# Patient Record
(demographics unavailable — no encounter records)

---

## 2019-07-30 NOTE — NUR
ED Nurse Note:

Patient walked into ED c/o left kidney pain that has been an ongoing issue for 
30 days now, states that shes had a prior history of kidney stones and is 
feeling same symptoms as well

## 2019-07-30 NOTE — EMERGENCY ROOM REPORT
History of Present Illness


General


Chief Complaint:  Abdominal Pain


Source:  Patient


 (Conner Mei MD)





Present Illness


HPI


HPI: This is a 28-year-old female with a history of recurrent nephrolithiasis 

status post surgical resection 5 years ago, ectopic pregnancy x2, depression/

anxiety presenting for evaluation of left-sided flank pain.  Symptoms began 

approximately 1 week ago.  She notes left-sided flank pain radiating to the 

left pelvis and through into the left groin.  She had 1 day of gross hematuria 

2 days ago however this is now resolved.  She denies any dysuria or vaginal 

discharge/bleeding otherwise.  This morning she began to experience significant 

nausea and several episodes of emesis which seemed to be now resolving.  Her 

left-sided flank pain is getting worse and describes it as similar to her 

previous episodes of nephrolithiasis.  Otherwise, she denies any fevers, chest 

pain, shortness of breath, URI symptoms, diarrhea, rash or other change in her 

health.





PMH: Nephrolithiasis, ectopic pregnancy





PSH: Lithotripsy, nephrostomy tubes, laparoscopy





Social Hx: Denies drug use, smoking, drinking alcohol


 (Conner Mei MD)


Allergies:  


Coded Allergies:  


     No Known Allergies (Unverified , 19)





Patient History


Last Menstrual Period:  19


Pregnant Now:  No


:  2


Para:  2


 (Conner Mei MD)





Nursing Documentation-[facility]


Past Medical History:  No History, Except For


History Of Psychiatric Problem:  Yes - Bipolar, Anxiety 


 (Conner Mei MD)





Review of Systems


All Other Systems:  negative except mentioned in HPI


 (Conner Mei MD)





Physical Exam





Vital Signs








  Date Time  Temp Pulse Resp B/P (MAP) Pulse Ox O2 Delivery O2 Flow Rate FiO2


 


19 19:33 98.8 80 18 113/75 (88) 98 Room Air  








General: Awake and alert, no acute distress


HEENT: NC/AT. EOMI. 


Neck: Supple, trachea midline


Chest Wall: No tenderness, no deformity


Cardiovascular: RRR.  S1 and S2 normal.  No murmur appreciated


Resp: Normal work of breathing. No cough, wheezing or crackles appreciated


Abdomen: Soft, tenderness in the left lower quadrant without rebound.  No 

masses appreciated.  Nondistended


Skin: Intact.  No abrasions, laceration or rash over the exposed skin


MSK: Normal tone and bulk.  No obvious deformity. Moving all extremities


Neuro: Awake and alert.  Mentating appropriately.  


Spine/Back: Left-sided CVA tenderness, no tenderness on the right.


 (Conner Mei MD)





 VS - Last 72 Hours, by Label








  Date Time  Temp Pulse Resp B/P (MAP) Pulse Ox O2 Delivery O2 Flow Rate FiO2


 


19 20:58 98.7       


 


19 19:45  80 18   Room Air  


 


19 19:33 98.8 80 18 113/75 (88) 98 Room Air  








Sp02 EP Interpretation:  reviewed, normal


General Appearance:  well appearing, no apparent distress, alert


Head:  normocephalic, atraumatic


Eyes:  bilateral eye PERRL, bilateral eye EOMI


ENT:  uvula midline, moist mucus membranes


Neck:  supple, thyroid normal, supple/symm/no masses


Respiratory:  lungs clear, no respiratory distress, no retraction, no accessory 

muscle use


Cardiovascular #1:  normal peripheral pulses, regular rate, rhythm, no edema, 

no gallop, no murmur


Gastrointestinal:  non tender, soft, no guarding, no rebound


Musculoskeletal:  normal inspection


Neurologic:  alert, oriented x3


Psychiatric:  mood/affect normal


Skin:  no rash, warm/dry


 (Gino Painter MD)





Medical Decision Making


Diagnostic Impression:  


 Primary Impression:  


 UTI (urinary tract infection)


 Additional Impression:  


 Renal calculus, right


ER Course


This is a 28-year-old female with a past history of nephrolithiasis and ectopic 

pregnancy presenting for evaluation of 1 week worsening left-sided flank pain 

now with symptoms of nausea, vomiting.  Differential includes but is not 

limited to nephrolithiasis, UTI, pyelonephritis, STI, ectopic pregnancy, 

abdominal pathology such as obstruction, kidney cysts.  Of these, either 

nephrolithiasis or pyelonephritis or ectopic pregnancy are the most concerning.

  Patient of a broad work-up including imaging with noncontrast CT, IV fluids, 

labs including pregnancy test, urinalysis and treatment with IV fluids, 

antiemetics and NSAIDs.


 (Conner Mei MD)


ER Course


Female presents with flank pain, nausea vomiting, patient had concern for renal 

stones, patient with a right kidney stone 2 mm nonobstructive, patient with a 

positive UTI, will provide patient with Keflex, return precautions were 

discussed, a cures report was ran, will provide patient with Flomax, Norco, 

Naprosyn, and antibiotics





Laboratory Tests








Test


  19


20:00


 


White Blood Count


  7.9 K/UL


(4.8-10.8)


 


Red Blood Count


  4.78 M/UL


(4.20-5.40)


 


Hemoglobin


  14.0 G/DL


(12.0-16.0)


 


Hematocrit


  40.3 %


(37.0-47.0)


 


Mean Corpuscular Volume 84 FL (80-99)  


 


Mean Corpuscular Hemoglobin


  29.2 PG


(27.0-31.0)


 


Mean Corpuscular Hemoglobin


Concent 34.7 G/DL


(32.0-36.0)


 


Red Cell Distribution Width


  10.8 %


(11.6-14.8)  L


 


Platelet Count


  242 K/UL


(150-450)


 


Mean Platelet Volume


  8.6 FL


(6.5-10.1)


 


Neutrophils (%) (Auto)


  64.9 %


(45.0-75.0)


 


Lymphocytes (%) (Auto)


  27.3 %


(20.0-45.0)


 


Monocytes (%) (Auto)


  4.6 %


(1.0-10.0)


 


Eosinophils (%) (Auto)


  2.1 %


(0.0-3.0)


 


Basophils (%) (Auto)


  1.0 %


(0.0-2.0)


 


Prothrombin Time


  10.3 SEC


(9.30-11.50)


 


Prothrombin Time INR 1.0 (0.9-1.1)  


 


PTT


  29 SEC (23-33)


 


 


Urine Color Pale yellow  


 


Urine Appearance Clear  


 


Urine pH 6 (4.5-8.0)  


 


Urine Specific Gravity


  1.015


(1.005-1.035)


 


Urine Protein


  Negative


(NEGATIVE)


 


Urine Glucose (UA)


  Negative


(NEGATIVE)


 


Urine Ketones


  Negative


(NEGATIVE)


 


Urine Blood


  3+ (NEGATIVE)


H


 


Urine Nitrite


  Negative


(NEGATIVE)


 


Urine Bilirubin


  Negative


(NEGATIVE)


 


Urine Urobilinogen


  Normal MG/DL


(0.0-1.0)


 


Urine Leukocyte Esterase


  1+ (NEGATIVE)


H


 


Urine RBC


  5-10 /HPF (0 -


2)  H


 


Urine WBC


  0-2 /HPF (0 -


2)


 


Urine Squamous Epithelial


Cells Many /LPF


(NONE/OCC)  H


 


Urine Bacteria


  Moderate /HPF


(NONE)  H


 


Urine Mucus


  Many /LPF


(NONE/OCC)  H


 


Urine HCG, Qualitative


  Negative


(NEGATIVE)


 


Sodium Level


  140 MMOL/L


(136-145)


 


Potassium Level


  3.7 MMOL/L


(3.5-5.1)


 


Chloride Level


  103 MMOL/L


()


 


Carbon Dioxide Level


  31 MMOL/L


(21-32)


 


Anion Gap


  6 mmol/L


(5-15)


 


Blood Urea Nitrogen


  15 mg/dL


(7-18)


 


Creatinine


  0.9 MG/DL


(0.55-1.30)


 


Estimate Glomerular


Filtration Rate > 60 mL/min


(>60)


 


Glucose Level


  71 MG/DL


()  L


 


Calcium Level


  8.8 MG/DL


(8.5-10.1)


 


Total Bilirubin


  0.3 MG/DL


(0.2-1.0)


 


Aspartate Amino Transferase


(AST) 13 U/L (15-37)


L


 


Alanine Aminotransferase (ALT)


  25 U/L (12-78)


 


 


Alkaline Phosphatase


  73 U/L


()


 


Total Protein


  7.8 G/DL


(6.4-8.2)


 


Albumin


  3.9 G/DL


(3.4-5.0)


 


Globulin 3.9 g/dL  


 


Albumin/Globulin Ratio 1.0 (1.0-2.7)  


 


Lipase


  146 U/L


()








 (Gino Painter MD)





CT/MRI/US Diagnostic Results


CT/MRI/US Diagnostic Results :  


   Impression


CT abdomen: Non obstructive stone right kidney 2mm 


 (Gino Painter MD)


Reevaluation Time:  20:30





Last Vital Signs








  Date Time  Temp Pulse Resp B/P (MAP) Pulse Ox O2 Delivery O2 Flow Rate FiO2


 


19 19:33 98.8 80 18 113/75 (88) 98 Room Air  








Reevaluation Impression


Lab work thus far shows no significant white count, normal renal function, 

negative pregnancy test.  Urinalysis did show 1+ leukocyte Estrace as well as 

bacteria with multiple epithelial cells concerning either for urinary tract 

infection or contaminated sample.  Patient is pending CT scan to evaluate for 

nephrolithiasis which in my opinion is most likely in the setting of 3+ blood 

on urinalysis and the patient's prior history.  If confirmed, she will likely 

require antibiotics.  She will be signed out to the oncoming physician pending 

CT scan and ultimate disposition.  She remains in stable condition.


 (Conner Mei MD)


Disposition:  HOME, SELF-CARE


Condition:  Stable


Signed Out To:  Dr. Painter pending CT


 (Conner Mei MD)


Scripts


Cephalexin* (CEPHALEXIN*) 500 Mg Tablet


500 MG ORAL EVERY 6 HOURS, #40 CAP


   Prov: Gino Painter MD         19 


Tamsulosin HCl (Flomax) 0.4 Mg Cap.er.24h


0.4 MG ORAL DAILY, #30 CAP


   Prov: Gino Painter MD         19 


Hydrocodone Bit/Acetaminophen 5-325* (NORCO 5-325*) 1 Each Tablet


1 TAB ORAL Q6H PRN for For Pain, #12 TAB 0 Refills


   Prov: Gino Painter MD         19


Referrals:  


[facility],REFERRING (PCP)











[facility]





[facility]


Patient Instructions:  Dietary Guidelines to Help Prevent Kidney Stones, Kidney 

Stones, Easy-to-Read





Additional Instructions:  


The patient was provided with discharge instructions, notified to follow-up 

with a primary care doctor and or specialist in the next 24-48 hours, and to 

return to the ED if they have worsening of their symptoms. 





Please note that this report is being documented using DRAGON technology.


This can lead to erroneous entry secondary to incorrect interpretation by the 

dictating instrument.











Conner Mei MD 2019 20:03


Gino aPinter MD 2019 21:27

## 2019-07-31 NOTE — DIAGNOSTIC IMAGING REPORT
Indication: Abdominal pain

 

Technique: Continuous helical transaxial imaging of the abdomen and pelvis was

obtained from the lung bases to the pubic symphysis. No intravenous contrast was

administered. Coronal 2-D reformats were also obtained. Automatic Exposure Control

was utilized.

 

 

Total Dose length Product (DLP):  921.88 mGycm

 

CT Dose Index Volume (CTDIvol):   16.58 mGy

 

Comparison: none

 

Findings: There is a 2 mm nonobstructive stone demonstrated within the lower pole the

right kidney. There is no hydronephrosis identified. There is no free fluid. Bowel

gas pattern is nonobstructive. There are small nodes present throughout the mesentery

especially on the right lower quadrant. Uterus noted. Bladder is nondistended. The

appendix is normal. Gallbladder is unremarkable.

 

IMPRESSION:

 

Nonobstructive 2 mm stone in the right kidney.

 

 

 

 

The CT scanner at [facility] is accredited by the American College of

Radiology and the scans are performed using dose optimization techniques as

appropriate to a performed exam including Automatic Exposure control.